# Patient Record
(demographics unavailable — no encounter records)

---

## 2025-04-24 NOTE — ASSESSMENT
[FreeTextEntry1] : #HCM - discussed healthy diet and exercise - discussed age appropriate cancer screenings and vaccines - labs drawn in office today. - prefers to go to pharmacy for COVID vaccine - DEXA - [facility] - FOBT

## 2025-04-24 NOTE — PHYSICAL EXAM
[No Acute Distress] : no acute distress [Well-Appearing] : well-appearing [No Edema] : there was no peripheral edema [Soft] : abdomen soft [Non Tender] : non-tender [Normal Bowel Sounds] : normal bowel sounds [Normal] : affect was normal and insight and judgment were intact [de-identified] : facial erythema with mild scale

## 2025-04-24 NOTE — HISTORY OF PRESENT ILLNESS
[de-identified] : Ms. MICHAEL MAN is a 69-year-old female with history of HTN. HLD, hypothyroidism, and gout presenting today for CPE.  She has been having issues with L shoulder pain. Trying intermittent fasting but has not lost weight. Still having difficulty with sleep. +Snoring. Has never had a sleep study.

## 2025-04-24 NOTE — HEALTH RISK ASSESSMENT
[Yes] : Yes [Monthly or less (1 pt)] : Monthly or less (1 point) [1 or 2 (0 pts)] : 1 or 2 (0 points) [Never (0 pts)] : Never (0 points) [No] : In the past 12 months have you used drugs other than those required for medical reasons? No [0] : 2) Feeling down, depressed, or hopeless: Not at all (0) [PHQ-2 Negative - No further assessment needed] : PHQ-2 Negative - No further assessment needed [Never] : Never [Fully functional (bathing, dressing, toileting, transferring, walking, feeding)] : Fully functional (bathing, dressing, toileting, transferring, walking, feeding) [Fully functional (using the telephone, shopping, preparing meals, housekeeping, doing laundry, using] : Fully functional and needs no help or supervision to perform IADLs (using the telephone, shopping, preparing meals, housekeeping, doing laundry, using transportation, managing medications and managing finances) [Reports changes in hearing] : Reports changes in hearing [Reports changes in vision] : Reports changes in vision [With Patient/Caregiver] : , with patient/caregiver [Audit-CScore] : 1 [de-identified] : stationary biking  [de-identified] : mostly healthy  [[id number]] : 0 [Reports changes in dental health] : Reports no changes in dental health [FreeTextEntry4] : Her daughters Mag Fitzpatrick and Rachelle Fitzpatrick would be decision makers. Discussed completing HCP form and MOLST form. "If I am on my death bed left me go." Considering DNR/DNI [AdvancecareDate] : 04/25

## 2025-06-18 NOTE — DATA REVIEWED
[de-identified] : Audiogram personally reviewed and interpreted and my findings are as follows (6/18/25): Right: SRT 35dB; %; Type A tymp; mild SNHL Left: SRT 35dB; %; Type As tymp; mild SNHL

## 2025-06-18 NOTE — PHYSICAL EXAM
[TextEntry] : General: AAO, no significant distress Psychiatric: affect pleasant and within normal limits Eyes: relatively symmetric, no obvious nystagmus Skin: no significant lesions on face Nose: no significant lesions; patent. Oral Cavity & Oropharynx: no significant deformity or lesions Neck/Lymphatics: no significant masses or abnormal cervical nodes palpated Respiratory: breathing comfortably; no significant distress Neurologic: cranial nerves II-XII grossly intact; EOMI Facial function: symmetric   Ear examination performed under binocular otologic microscope: Left Ear External: Pinna and periauricular area is normal. Canal: Ear canal skin is not inflamed or edematous. Left cerumen impaction cleaned under microscopy with instrumentation Tympanic Membrane: Intact and in good position.   Right Ear External: Pinna and periauricular area is normal. Canal: Ear canal skin is not inflamed or edematous. Right cerumen impaction cleaned under microscopy with instrumentation Tympanic Membrane: Intact and in good position.   Procedure: Left cerumen removal Pre-operative Diagnosis: Left cerumen impaction Post-operative Diagnosis: Left cerumen impaction Anesthesia: None Procedure Details: The patient was placed in the supine position. The left ear canal was determined to be impacted with cerumen. A curette and/or suction was used to remove the cerumen impaction under microscopy. Condition: Stable. Patient tolerated procedure well. Complications: None.   Procedure: Right cerumen removal Pre-operative Diagnosis: Right cerumen impaction Post-operative Diagnosis: Right cerumen impaction Anesthesia: None Procedure Details: The patient was placed in the supine position. The right ear canal was determined to be impacted with cerumen. A curette and/or suction was used to remove the cerumen impaction under microscopy. Condition: Stable. Patient tolerated procedure well. Complications: None.   Nasal Endoscopy:   Pre-operative Diagnosis: snoring Post-operative Diagnosis: snoring Anesthesia: None Procedure: Bilateral nasal endoscopy Procedure Details:   The patient was placed in the seated position sitting straight up. The telescope was passed along the left nasal floor to the nasopharynx. It was then passed into the region of the middle meatus, middle turbinate, and the sphenoethmoid region. An identical procedure was performed on the right side.   Findings: Interior nasal cavity: normal bilaterally Middle and superior meatus: normal bilaterally Sphenoethmoidal recess: normal bilaterally Mucosa: normal bilaterally Nasal septum: normal Discharge: none bilaterally Turbinates: normal bilaterally Adenoid: normal bilaterally Posterior choanae: normal bilaterally Eustachian tubes: normal bilaterally Mucous stranding: normal bilaterally Lesions: Not present   Comments: CONCENTRIC COLLAPSE OF NASOPHARYNX AND POSTERIOR PHARYNX   Condition: Stable. Patient tolerated procedure well.

## 2025-06-18 NOTE — ASSESSMENT
[FreeTextEntry1] : SNHL / Bilateral tinnitus - 2 chronic illness - bilateral symmetric mild SNHL - audiogram reviewed with patient - discussed connection between hearing loss and dementia/cognitive decline - use over the counter hearing aids for both SNHL and tinnitus - f/u in 1 year with hearing test  Snoring - 1 chronic illness - likely has TOMMIE - pt defers sleep study - recommend weight loss  Bilateral Cerumen Impaction - 1 chronic illness - Bilateral cerumen impaction removed under microscopy with instrumentation

## 2025-06-18 NOTE — HISTORY OF PRESENT ILLNESS
[de-identified] : 69F who presents for earwax cleaning and hearing test. She has L>R bilateral nonpulsatile tinnitus that comes and goes. No otalgia, otorrhea, vertigo, acute hearing changes. She bought over the counter hearing aids recently. She endorses chronic snoring, headaches in the morning, and fatigue. She does not want a slep study.  negative Affect and characteristics of appearance, verbalizations, behaviors are appropriate